# Patient Record
Sex: MALE | Race: BLACK OR AFRICAN AMERICAN | URBAN - METROPOLITAN AREA
[De-identification: names, ages, dates, MRNs, and addresses within clinical notes are randomized per-mention and may not be internally consistent; named-entity substitution may affect disease eponyms.]

---

## 2022-09-16 ENCOUNTER — OFFICE VISIT (OUTPATIENT)
Dept: URBAN - METROPOLITAN AREA CLINIC 105 | Facility: CLINIC | Age: 27
End: 2022-09-16

## 2022-09-16 ENCOUNTER — WEB ENCOUNTER (OUTPATIENT)
Dept: URBAN - METROPOLITAN AREA CLINIC 105 | Facility: CLINIC | Age: 27
End: 2022-09-16

## 2022-09-16 VITALS
HEART RATE: 49 BPM | WEIGHT: 172.2 LBS | DIASTOLIC BLOOD PRESSURE: 72 MMHG | SYSTOLIC BLOOD PRESSURE: 114 MMHG | HEIGHT: 78 IN | TEMPERATURE: 97.3 F | BODY MASS INDEX: 19.92 KG/M2

## 2022-09-16 DIAGNOSIS — R14.3 FLATULENCE: ICD-10-CM

## 2022-09-16 DIAGNOSIS — R19.5 LOOSE STOOLS: ICD-10-CM

## 2022-09-16 DIAGNOSIS — R14.0 BLOATING: ICD-10-CM

## 2022-09-16 DIAGNOSIS — R19.4 CHANGE IN BOWEL HABITS: ICD-10-CM

## 2022-09-16 PROCEDURE — 99203 OFFICE O/P NEW LOW 30 MIN: CPT | Performed by: INTERNAL MEDICINE

## 2022-09-16 NOTE — HPI-TODAY'S VISIT:
Pt comes with symptoms that started about 1 year ago. pt says that throughout the day he will have lots of gas. has lots of pressure in the lower abdomen. feels like bloating and a heavy feeling in the stomach until it passes. his stools are still normal. he can occasionally have some diarrhea.  on a bad day with diarrhea, he can go up to about 3-4 times. gas can keep him up at night. no nocturnal diarrhea. it never is just "straight liquid" but mushy and soft. no blood in the stool.

## 2022-09-19 ENCOUNTER — LAB OUTSIDE AN ENCOUNTER (OUTPATIENT)
Dept: URBAN - METROPOLITAN AREA CLINIC 105 | Facility: CLINIC | Age: 27
End: 2022-09-19

## 2022-09-28 LAB — GASTROINTESTINAL PATHOGEN: (no result)

## 2022-09-29 ENCOUNTER — OFFICE VISIT (OUTPATIENT)
Dept: URBAN - METROPOLITAN AREA CLINIC 105 | Facility: CLINIC | Age: 27
End: 2022-09-29

## 2022-09-29 VITALS
HEART RATE: 70 BPM | SYSTOLIC BLOOD PRESSURE: 152 MMHG | WEIGHT: 179 LBS | DIASTOLIC BLOOD PRESSURE: 69 MMHG | BODY MASS INDEX: 20.71 KG/M2 | TEMPERATURE: 97.4 F | HEIGHT: 78 IN

## 2022-09-29 DIAGNOSIS — R19.4 CHANGE IN BOWEL HABITS: ICD-10-CM

## 2022-09-29 DIAGNOSIS — R14.3 FLATULENCE: ICD-10-CM

## 2022-09-29 DIAGNOSIS — R14.0 BLOATING: ICD-10-CM

## 2022-09-29 DIAGNOSIS — K62.89 RECTAL PAIN: ICD-10-CM

## 2022-09-29 PROCEDURE — 99213 OFFICE O/P EST LOW 20 MIN: CPT | Performed by: INTERNAL MEDICINE

## 2022-09-29 RX ORDER — SODIUM, POTASSIUM,MAG SULFATES 17.5-3.13G
177 ML SOLUTION, RECONSTITUTED, ORAL ORAL
Qty: 1 KIT | Refills: 0 | OUTPATIENT
Start: 2022-09-29

## 2022-09-29 RX ORDER — BISACODYL 5 MG
TAKE 4 TABLET, DELAYED RELEASE (ENTERIC COATED) ORAL
Qty: 4 | OUTPATIENT
Start: 2022-09-29 | End: 2022-09-30

## 2022-09-29 NOTE — HPI-TODAY'S VISIT:
Pt comes with symptoms that started about 1 year ago. pt says that throughout the day he will have lots of gas. has lots of pressure in the lower abdomen. feels like bloating and a heavy feeling in the stomach until it passes. his stools are still normal. he can occasionally have some diarrhea.  on a bad day with diarrhea, he can go up to about 3-4 times. gas can keep him up at night. no nocturnal diarrhea. it never is just "straight liquid" but mushy and soft. no blood in the stool. - ] 09/29/2022: I just saw this man last week for slight change in bowel habits.  I ordered a host of stool test to evaluate this and half of them have not been resulted. - he was started on cultrelle. i also give him info about the FODMAPs diet. he is still feeling gassy and bloated. he is getting the feeling he is not completly emptying his rectum. it first started in october of last year. this recent episode has been there about a month.  he denies any blood in the stool.  his normal bowel pattern is once a day. now he is going multiple times a day and has to make multiple trips to have a stool.  -

## 2022-10-24 ENCOUNTER — OFFICE VISIT (OUTPATIENT)
Dept: URBAN - METROPOLITAN AREA SURGERY CENTER 16 | Facility: SURGERY CENTER | Age: 27
End: 2022-10-24

## 2022-11-02 ENCOUNTER — OFFICE VISIT (OUTPATIENT)
Dept: URBAN - METROPOLITAN AREA CLINIC 105 | Facility: CLINIC | Age: 27
End: 2022-11-02

## 2022-11-02 VITALS
TEMPERATURE: 96.9 F | HEART RATE: 61 BPM | SYSTOLIC BLOOD PRESSURE: 109 MMHG | DIASTOLIC BLOOD PRESSURE: 67 MMHG | HEIGHT: 78 IN | WEIGHT: 171 LBS | BODY MASS INDEX: 19.78 KG/M2

## 2022-11-02 DIAGNOSIS — K59.01 CONSTIPATION BY DELAYED COLONIC TRANSIT: ICD-10-CM

## 2022-11-02 DIAGNOSIS — R19.4 CHANGE IN BOWEL HABITS: ICD-10-CM

## 2022-11-02 DIAGNOSIS — R10.30 LOWER ABDOMINAL PAIN: ICD-10-CM

## 2022-11-02 PROBLEM — 35298007: Status: ACTIVE | Noted: 2022-11-02

## 2022-11-02 PROCEDURE — 99213 OFFICE O/P EST LOW 20 MIN: CPT | Performed by: INTERNAL MEDICINE

## 2022-11-02 RX ORDER — SODIUM, POTASSIUM,MAG SULFATES 17.5-3.13G
177 ML SOLUTION, RECONSTITUTED, ORAL ORAL
Qty: 1 KIT | Refills: 0 | Status: ON HOLD | COMMUNITY
Start: 2022-09-29

## 2022-11-02 NOTE — HPI-TODAY'S VISIT:
Pt comes with symptoms that started about 1 year ago. pt says that throughout the day he will have lots of gas. has lots of pressure in the lower abdomen. feels like bloating and a heavy feeling in the stomach until it passes. his stools are still normal. he can occasionally have some diarrhea.  on a bad day with diarrhea, he can go up to about 3-4 times. gas can keep him up at night. no nocturnal diarrhea. it never is just "straight liquid" but mushy and soft. no blood in the stool. - ] 09/29/2022: I just saw this man last week for slight change in bowel habits.  I ordered a host of stool test to evaluate this and half of them have not been resulted. - he was started on cultrelle. i also give him info about the FODMAPs diet. he is still feeling gassy and bloated. he is getting the feeling he is not completly emptying his rectum. it first started in october of last year. this recent episode has been there about a month.  he denies any blood in the stool.  his normal bowel pattern is once a day. now he is going multiple times a day and has to make multiple trips to have a stool.  - 11/2/2022: Pt has tried miralax and also has tried some colace. KUB at last visit showed constipation. we planned colonoscopy but he canceled due to cost.

## 2022-11-02 NOTE — PHYSICAL EXAM CHEST:
Nelson Marroquin. chest wall non-tender, breathing is unlabored without accessory muscle use, normal breath sounds

## 2022-11-14 ENCOUNTER — TELEPHONE ENCOUNTER (OUTPATIENT)
Dept: URBAN - METROPOLITAN AREA CLINIC 103 | Facility: CLINIC | Age: 27
End: 2022-11-14

## 2022-12-13 ENCOUNTER — OFFICE VISIT (OUTPATIENT)
Dept: URBAN - METROPOLITAN AREA CLINIC 105 | Facility: CLINIC | Age: 27
End: 2022-12-13

## 2022-12-13 VITALS
DIASTOLIC BLOOD PRESSURE: 64 MMHG | WEIGHT: 174.4 LBS | HEART RATE: 60 BPM | TEMPERATURE: 97.1 F | BODY MASS INDEX: 20.18 KG/M2 | SYSTOLIC BLOOD PRESSURE: 103 MMHG | HEIGHT: 78 IN

## 2022-12-13 DIAGNOSIS — K62.5 RECTAL BLEEDING: ICD-10-CM

## 2022-12-13 DIAGNOSIS — K59.00 CONSTIPATION, UNSPECIFIED CONSTIPATION TYPE: ICD-10-CM

## 2022-12-13 DIAGNOSIS — R19.4 CHANGE IN BOWEL HABITS: ICD-10-CM

## 2022-12-13 PROBLEM — 14760008: Status: ACTIVE | Noted: 2022-12-13

## 2022-12-13 PROCEDURE — 99213 OFFICE O/P EST LOW 20 MIN: CPT | Performed by: INTERNAL MEDICINE

## 2022-12-13 RX ORDER — SODIUM, POTASSIUM,MAG SULFATES 17.5-3.13G
177 ML SOLUTION, RECONSTITUTED, ORAL ORAL
Qty: 1 KIT | Refills: 0 | Status: ON HOLD | COMMUNITY
Start: 2022-09-29

## 2022-12-13 NOTE — HPI-TODAY'S VISIT:
Pt comes with symptoms that started about 1 year ago. pt says that throughout the day he will have lots of gas. has lots of pressure in the lower abdomen. feels like bloating and a heavy feeling in the stomach until it passes. his stools are still normal. he can occasionally have some diarrhea.  on a bad day with diarrhea, he can go up to about 3-4 times. gas can keep him up at night. no nocturnal diarrhea. it never is just "straight liquid" but mushy and soft. no blood in the stool. - ] 09/29/2022: I just saw this man last week for slight change in bowel habits.  I ordered a host of stool test to evaluate this and half of them have not been resulted. - he was started on cultrelle. i also give him info about the FODMAPs diet. he is still feeling gassy and bloated. he is getting the feeling he is not completly emptying his rectum. it first started in october of last year. this recent episode has been there about a month.  he denies any blood in the stool.  his normal bowel pattern is once a day. now he is going multiple times a day and has to make multiple trips to have a stool.  - 11/2/2022: Pt has tried miralax and also has tried some colace. KUB at last visit showed constipation. we planned colonoscopy but he canceled due to cost. - 12/13/2022: he took a colonic on december 3rd and after that he started to see blood in the stool.  it was every time he passed a stool.   - saw a "dispatch visit". and someone came to him and told him that he had a hemorrhoid. - He said he took the Linzess but it caused diarrhea. these current symptoms have been off and on for about year. -

## 2023-02-06 ENCOUNTER — OFFICE VISIT (OUTPATIENT)
Dept: URBAN - METROPOLITAN AREA SURGERY CENTER 16 | Facility: SURGERY CENTER | Age: 28
End: 2023-02-06

## 2023-02-06 ENCOUNTER — TELEPHONE ENCOUNTER (OUTPATIENT)
Dept: URBAN - METROPOLITAN AREA CLINIC 105 | Facility: CLINIC | Age: 28
End: 2023-02-06

## 2023-02-06 DIAGNOSIS — R19.4 ALTERATION IN BOWEL ELIMINATION: ICD-10-CM

## 2023-02-06 DIAGNOSIS — K92.1 ACUTE MELENA: ICD-10-CM

## 2023-02-06 PROCEDURE — G8907 PT DOC NO EVENTS ON DISCHARG: HCPCS | Performed by: INTERNAL MEDICINE

## 2023-02-06 PROCEDURE — 45378 DIAGNOSTIC COLONOSCOPY: CPT | Performed by: INTERNAL MEDICINE

## 2023-02-06 RX ORDER — SODIUM, POTASSIUM,MAG SULFATES 17.5-3.13G
177 ML SOLUTION, RECONSTITUTED, ORAL ORAL
Qty: 1 KIT | Refills: 0 | Status: ON HOLD | COMMUNITY
Start: 2022-09-29

## 2023-04-19 ENCOUNTER — APPOINTMENT (OUTPATIENT)
Dept: URBAN - METROPOLITAN AREA CLINIC 207 | Age: 28
Setting detail: DERMATOLOGY
End: 2023-05-04

## 2023-04-19 DIAGNOSIS — L21.8 OTHER SEBORRHEIC DERMATITIS: ICD-10-CM

## 2023-04-19 PROCEDURE — OTHER COUNSELING: OTHER

## 2023-04-19 PROCEDURE — OTHER PRESCRIPTION MEDICATION MANAGEMENT: OTHER

## 2023-04-19 PROCEDURE — 99204 OFFICE O/P NEW MOD 45 MIN: CPT

## 2023-04-19 PROCEDURE — OTHER PRESCRIPTION: OTHER

## 2023-04-19 RX ORDER — FLUOCINOLONE ACETONIDE 0.11 MG/ML
OIL TOPICAL
Qty: 118.28 | Refills: 4 | Status: ERX | COMMUNITY
Start: 2023-04-19

## 2023-04-19 ASSESSMENT — LOCATION SIMPLE DESCRIPTION DERM: LOCATION SIMPLE: SCALP

## 2023-04-19 ASSESSMENT — LOCATION DETAILED DESCRIPTION DERM: LOCATION DETAILED: LEFT SUPERIOR PARIETAL SCALP

## 2023-04-19 ASSESSMENT — LOCATION ZONE DERM: LOCATION ZONE: SCALP

## 2023-04-19 NOTE — PROCEDURE: PRESCRIPTION MEDICATION MANAGEMENT
Detail Level: Zone
Render In Strict Bullet Format?: No
Initiate Treatment: OTC Juan Daniel scalp treatment conditioner/shampoo every other day\\nOTC Juan Daniel scalp treatment serum\\n\\nDerma-Smoothe/FS Scalp Oil 0.01 % \\nSig: Apply to scalp once a day x 1-2 weeks then once a week for maintenance

## 2023-05-24 ENCOUNTER — APPOINTMENT (OUTPATIENT)
Dept: URBAN - METROPOLITAN AREA CLINIC 207 | Age: 28
Setting detail: DERMATOLOGY
End: 2023-05-25

## 2023-05-24 DIAGNOSIS — L21.8 OTHER SEBORRHEIC DERMATITIS: ICD-10-CM

## 2023-05-24 PROCEDURE — OTHER COUNSELING: OTHER

## 2023-05-24 PROCEDURE — OTHER PRESCRIPTION: OTHER

## 2023-05-24 PROCEDURE — OTHER PRESCRIPTION MEDICATION MANAGEMENT: OTHER

## 2023-05-24 PROCEDURE — 99214 OFFICE O/P EST MOD 30 MIN: CPT

## 2023-05-24 RX ORDER — FLUOCINOLONE ACETONIDE 0.11 MG/ML
OIL TOPICAL
Qty: 118.28 | Refills: 3 | Status: ERX

## 2023-05-24 ASSESSMENT — LOCATION ZONE DERM: LOCATION ZONE: SCALP

## 2023-05-24 ASSESSMENT — LOCATION DETAILED DESCRIPTION DERM: LOCATION DETAILED: LEFT SUPERIOR PARIETAL SCALP

## 2023-05-24 ASSESSMENT — LOCATION SIMPLE DESCRIPTION DERM: LOCATION SIMPLE: SCALP

## 2023-05-24 NOTE — PROCEDURE: PRESCRIPTION MEDICATION MANAGEMENT
Render In Strict Bullet Format?: No
Continue Regimen: Derma-Smoothe scalp oil prn flares.
Detail Level: Zone
Plan: -recommend pt to use Derma-Smoothe scalp oil bid when he flares up, intermittently i recommend that pt was with Juan Daniel shampoo and serum.

## 2023-05-30 ENCOUNTER — OFFICE VISIT (OUTPATIENT)
Dept: URBAN - METROPOLITAN AREA CLINIC 105 | Facility: CLINIC | Age: 28
End: 2023-05-30

## 2023-05-30 ENCOUNTER — DASHBOARD ENCOUNTERS (OUTPATIENT)
Age: 28
End: 2023-05-30

## 2023-05-30 VITALS
HEIGHT: 78 IN | WEIGHT: 168 LBS | TEMPERATURE: 97.3 F | HEART RATE: 75 BPM | BODY MASS INDEX: 19.44 KG/M2 | SYSTOLIC BLOOD PRESSURE: 126 MMHG | DIASTOLIC BLOOD PRESSURE: 70 MMHG

## 2023-05-30 DIAGNOSIS — K59.09 CHRONIC CONSTIPATION: ICD-10-CM

## 2023-05-30 DIAGNOSIS — K92.1 HEMATOCHEZIA: ICD-10-CM

## 2023-05-30 DIAGNOSIS — K64.8 INTERNAL HEMORRHOIDS: ICD-10-CM

## 2023-05-30 PROCEDURE — 99213 OFFICE O/P EST LOW 20 MIN: CPT | Performed by: INTERNAL MEDICINE

## 2023-05-30 RX ORDER — SODIUM, POTASSIUM,MAG SULFATES 17.5-3.13G
177 ML SOLUTION, RECONSTITUTED, ORAL ORAL
Qty: 1 KIT | Refills: 0 | Status: ON HOLD | COMMUNITY
Start: 2022-09-29

## 2023-05-30 NOTE — HPI-TODAY'S VISIT:
Pt comes with symptoms that started about 1 year ago. pt says that throughout the day he will have lots of gas. has lots of pressure in the lower abdomen. feels like bloating and a heavy feeling in the stomach until it passes. his stools are still normal. he can occasionally have some diarrhea.  on a bad day with diarrhea, he can go up to about 3-4 times. gas can keep him up at night. no nocturnal diarrhea. it never is just "straight liquid" but mushy and soft. no blood in the stool. - ] 09/29/2022: I just saw this man last week for slight change in bowel habits.  I ordered a host of stool test to evaluate this and half of them have not been resulted. - he was started on cultrelle. i also give him info about the FODMAPs diet. he is still feeling gassy and bloated. he is getting the feeling he is not completly emptying his rectum. it first started in october of last year. this recent episode has been there about a month.  he denies any blood in the stool.  his normal bowel pattern is once a day. now he is going multiple times a day and has to make multiple trips to have a stool.  - 11/2/2022: Pt has tried miralax and also has tried some colace. KUB at last visit showed constipation. we planned colonoscopy but he canceled due to cost. - 12/13/2022: he took a colonic on december 3rd and after that he started to see blood in the stool.  it was every time he passed a stool.   - saw a "dispatch visit". and someone came to him and told him that he had a hemorrhoid. - He said he took the Linzess but it caused diarrhea. these current symptoms have been off and on for about year. -   05/30/2023:  In February 2023 I did this patient's colonoscopy secondary to rectal bleeding and constipation with change of bowel habits.  we trialed some Trulance that he says did not help. was giving him diarrhea.  no more bleeding. having a stool every day.

## 2023-06-26 ENCOUNTER — APPOINTMENT (OUTPATIENT)
Dept: URBAN - METROPOLITAN AREA CLINIC 207 | Age: 28
Setting detail: DERMATOLOGY
End: 2023-06-27

## 2023-06-26 DIAGNOSIS — L21.8 OTHER SEBORRHEIC DERMATITIS: ICD-10-CM

## 2023-06-26 PROCEDURE — OTHER COUNSELING: OTHER

## 2023-06-26 PROCEDURE — OTHER PRESCRIPTION: OTHER

## 2023-06-26 PROCEDURE — 99213 OFFICE O/P EST LOW 20 MIN: CPT

## 2023-06-26 PROCEDURE — OTHER PRESCRIPTION MEDICATION MANAGEMENT: OTHER

## 2023-06-26 RX ORDER — FLUOCINOLONE ACETONIDE 0.11 MG/ML
OIL TOPICAL
Qty: 118.28 | Refills: 3 | Status: ERX

## 2023-06-26 ASSESSMENT — LOCATION ZONE DERM: LOCATION ZONE: SCALP

## 2023-06-26 ASSESSMENT — LOCATION DETAILED DESCRIPTION DERM: LOCATION DETAILED: LEFT SUPERIOR PARIETAL SCALP

## 2023-06-26 ASSESSMENT — LOCATION SIMPLE DESCRIPTION DERM: LOCATION SIMPLE: SCALP

## 2023-12-05 ENCOUNTER — OFFICE VISIT (OUTPATIENT)
Dept: URBAN - METROPOLITAN AREA CLINIC 105 | Facility: CLINIC | Age: 28
End: 2023-12-05